# Patient Record
(demographics unavailable — no encounter records)

---

## 2024-12-26 NOTE — HISTORY OF PRESENT ILLNESS
[FreeTextEntry8] : back pain for 1 week - seen orth 12/24/2024 and was told muscle pain  left knee swollen for 2 yrs  pelvic pain - 1 week

## 2025-01-17 NOTE — HISTORY OF PRESENT ILLNESS
[Home] : at home, [unfilled] , at the time of the visit. [Medical Office: ([facility])___] : at the medical office located in  [Verbal consent obtained from patient] : the patient, [unfilled] [FreeTextEntry1] : Patient presents today for follow-up of chronic medical conditions.  [de-identified] : PMHx - Brain cyst, HLD   Patient with ongoing groin/pelvic pain  Discussed recent pelvic US showing ?fluid in the EM cavity of unclear significance Patient otherwise feels well - and pain/discomfort is mild  Several years ago - was having headaches and eventually had CT head showing a brain tumor  Patient underwent several neurosurgical consultations - after which the ultimate decision was made for conservative management  Plan to follow-up with interval imaging - and if no change will opt to avoid surgery   No social alcohol and now no headaches   Interim  4/2024 -  Has not started statin - hesitant to begin due to potential side effects Discussed with  who is cardiologist - recommends to start Has been having intermittent neck pain for last several weeks - denies associated HA Otherwise neurologically intact - no trauma, ROS otherwise negative

## 2025-01-17 NOTE — HISTORY OF PRESENT ILLNESS
[Home] : at home, [unfilled] , at the time of the visit. [Medical Office: ([facility])___] : at the medical office located in  [Verbal consent obtained from patient] : the patient, [unfilled] [FreeTextEntry1] : Patient presents today for follow-up of chronic medical conditions.  [de-identified] : PMHx - Brain cyst, HLD   Patient with ongoing groin/pelvic pain  Discussed recent pelvic US showing ?fluid in the EM cavity of unclear significance Patient otherwise feels well - and pain/discomfort is mild  Several years ago - was having headaches and eventually had CT head showing a brain tumor  Patient underwent several neurosurgical consultations - after which the ultimate decision was made for conservative management  Plan to follow-up with interval imaging - and if no change will opt to avoid surgery   No social alcohol and now no headaches   Interim  4/2024 -  Has not started statin - hesitant to begin due to potential side effects Discussed with  who is cardiologist - recommends to start Has been having intermittent neck pain for last several weeks - denies associated HA Otherwise neurologically intact - no trauma, ROS otherwise negative

## 2025-01-17 NOTE — HEALTH RISK ASSESSMENT
[Good] : ~his/her~  mood as  good [No] : In the past 12 months have you used drugs other than those required for medical reasons? No [No falls in past year] : Patient reported no falls in the past year [0] : 2) Feeling down, depressed, or hopeless: Not at all (0) [PHQ-2 Negative - No further assessment needed] : PHQ-2 Negative - No further assessment needed [Never] : Never [Patient reported PAP Smear was normal] : Patient reported PAP Smear was normal [None] : None [With Family] : lives with family [Employed] : employed [] :  [# Of Children ___] : has [unfilled] children [Fully functional (bathing, dressing, toileting, transferring, walking, feeding)] : Fully functional (bathing, dressing, toileting, transferring, walking, feeding) [Fully functional (using the telephone, shopping, preparing meals, housekeeping, doing laundry, using] : Fully functional and needs no help or supervision to perform IADLs (using the telephone, shopping, preparing meals, housekeeping, doing laundry, using transportation, managing medications and managing finances) [de-identified] : active [de-identified] : balanced varied diet without restrictions [[id number]] : 0 [Language] : denies difficulty with language [Handling Complex Tasks] : denies difficulty handling complex tasks [High Risk Behavior] : no high risk behavior [Reports changes in hearing] : Reports no changes in hearing [Reports changes in vision] : Reports no changes in vision [Reports changes in dental health] : Reports no changes in dental health [MammogramComments] : referral [PapSmearComments] : referral [PapSmearDate] : ? 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

## 2025-01-17 NOTE — HEALTH RISK ASSESSMENT
[Good] : ~his/her~  mood as  good [No] : In the past 12 months have you used drugs other than those required for medical reasons? No [No falls in past year] : Patient reported no falls in the past year [0] : 2) Feeling down, depressed, or hopeless: Not at all (0) [PHQ-2 Negative - No further assessment needed] : PHQ-2 Negative - No further assessment needed [Never] : Never [Patient reported PAP Smear was normal] : Patient reported PAP Smear was normal [None] : None [With Family] : lives with family [Employed] : employed [] :  [# Of Children ___] : has [unfilled] children [Fully functional (bathing, dressing, toileting, transferring, walking, feeding)] : Fully functional (bathing, dressing, toileting, transferring, walking, feeding) [Fully functional (using the telephone, shopping, preparing meals, housekeeping, doing laundry, using] : Fully functional and needs no help or supervision to perform IADLs (using the telephone, shopping, preparing meals, housekeeping, doing laundry, using transportation, managing medications and managing finances) [de-identified] : active [de-identified] : balanced varied diet without restrictions [[id number]] : 0 [Language] : denies difficulty with language [Handling Complex Tasks] : denies difficulty handling complex tasks [High Risk Behavior] : no high risk behavior [Reports changes in hearing] : Reports no changes in hearing [Reports changes in vision] : Reports no changes in vision [Reports changes in dental health] : Reports no changes in dental health [MammogramComments] : referral [PapSmearComments] : referral [PapSmearDate] : ? 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

## 2025-01-17 NOTE — HISTORY OF PRESENT ILLNESS
[Home] : at home, [unfilled] , at the time of the visit. [Medical Office: ([facility])___] : at the medical office located in  [Verbal consent obtained from patient] : the patient, [unfilled] [FreeTextEntry1] : Patient presents today for follow-up of chronic medical conditions.  [de-identified] : PMHx - Brain cyst, HLD   Patient with ongoing groin/pelvic pain  Discussed recent pelvic US showing ?fluid in the EM cavity of unclear significance Patient otherwise feels well - and pain/discomfort is mild  Several years ago - was having headaches and eventually had CT head showing a brain tumor  Patient underwent several neurosurgical consultations - after which the ultimate decision was made for conservative management  Plan to follow-up with interval imaging - and if no change will opt to avoid surgery   No social alcohol and now no headaches   Interim  4/2024 -  Has not started statin - hesitant to begin due to potential side effects Discussed with  who is cardiologist - recommends to start Has been having intermittent neck pain for last several weeks - denies associated HA Otherwise neurologically intact - no trauma, ROS otherwise negative

## 2025-01-17 NOTE — PLAN
[FreeTextEntry1] : [ ] pap smear  [ ] mammo + US  [ ] colonoscopy  [ ] PT referral  [ ] bone density   #HLD  lifestyle modification discussed at length repeat lipid profile 3 months ** encouraged to start statin  #Pelvic pain pelvic US showing moderate fluid distending hte EM cavity revealing nodularity versus debris along the EM lining [ ] CT abdomen/pelvis to assess surrounding structures [ ] EM biopsy per gynecology

## 2025-01-17 NOTE — HEALTH RISK ASSESSMENT
[Good] : ~his/her~  mood as  good [No] : In the past 12 months have you used drugs other than those required for medical reasons? No [No falls in past year] : Patient reported no falls in the past year [0] : 2) Feeling down, depressed, or hopeless: Not at all (0) [PHQ-2 Negative - No further assessment needed] : PHQ-2 Negative - No further assessment needed [Never] : Never [Patient reported PAP Smear was normal] : Patient reported PAP Smear was normal [None] : None [With Family] : lives with family [Employed] : employed [] :  [# Of Children ___] : has [unfilled] children [Fully functional (bathing, dressing, toileting, transferring, walking, feeding)] : Fully functional (bathing, dressing, toileting, transferring, walking, feeding) [Fully functional (using the telephone, shopping, preparing meals, housekeeping, doing laundry, using] : Fully functional and needs no help or supervision to perform IADLs (using the telephone, shopping, preparing meals, housekeeping, doing laundry, using transportation, managing medications and managing finances) [de-identified] : active [de-identified] : balanced varied diet without restrictions [[id number]] : 0 [Language] : denies difficulty with language [High Risk Behavior] : no high risk behavior [Handling Complex Tasks] : denies difficulty handling complex tasks [Reports changes in hearing] : Reports no changes in hearing [Reports changes in vision] : Reports no changes in vision [Reports changes in dental health] : Reports no changes in dental health [MammogramComments] : referral [PapSmearComments] : referral [PapSmearDate] : ? 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

## 2025-02-05 NOTE — HISTORY OF PRESENT ILLNESS
[FreeTextEntry1] : WWV P0 Pt is a OT Rev TVS--rec pelvic MRI to assess EL--pt declines [Mammogramdate] : 2020 [PapSmeardate] : 2020 [ColonoscopyDate] : declines

## 2025-02-05 NOTE — PHYSICAL EXAM
[Chaperone Present] : A chaperone was present in the examining room during all aspects of the physical examination [FreeTextEntry2] : Negin Bird [Appropriately responsive] : appropriately responsive [Alert] : alert [No Acute Distress] : no acute distress [No Lymphadenopathy] : no lymphadenopathy [Soft] : soft [Non-tender] : non-tender [Non-distended] : non-distended [No HSM] : No HSM [No Lesions] : no lesions [No Mass] : no mass [Oriented x3] : oriented x3 [Examination Of The Breasts] : a normal appearance [No Masses] : no breast masses were palpable [Labia Majora] : normal [Labia Minora] : normal [Atrophy] : atrophy [Normal] : normal [Uterine Adnexae] : normal

## 2025-02-14 NOTE — PHYSICAL EXAM
[General Appearance - Alert] : alert [General Appearance - In No Acute Distress] : in no acute distress [Oriented To Time, Place, And Person] : oriented to person, place, and time [Affect] : the affect was normal [Span Intact] : the attention span was normal [Concentration Intact] : normal concentrating ability [Fluency] : fluency intact [Comprehension] : comprehension intact [Cranial Nerves Oculomotor (III)] : extraocular motion intact [Cranial Nerves Facial (VII)] : face symmetrical [Cranial Nerves Vestibulocochlear (VIII)] : hearing was intact bilaterally [Cranial Nerves Accessory (XI - Cranial And Spinal)] : head turning and shoulder shrug symmetric [Cranial Nerves Hypoglossal (XII)] : there was no tongue deviation with protrusion [Motor Tone] : muscle tone was normal in all four extremities [Motor Strength] : muscle strength was normal in all four extremities [Involuntary Movements] : no involuntary movements were seen [Abnormal Walk] : normal gait [Balance] : balance was intact [Limited Balance] : balance was intact

## 2025-02-14 NOTE — ASSESSMENT
[FreeTextEntry1] : My impression is that the patient suffers from a left frontal mass that is currently stable. The patient's established problem of headache has resolved. 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 Her KPS is 100. I had a long discussion with the patient regarding the role of follow up with serial MRI. The patient was extensively educated about the nature of her disease process. Therapeutic and diagnostic tests include MRI brain and without contrast in 2 year. I will see the patient back in Jan/Feb 2027 with a new MRI.

## 2025-02-14 NOTE — HISTORY OF PRESENT ILLNESS
[de-identified] : 67 year old female with longstanding headaches more recently more pronounced on the left side with tinnitus. Her headaches are relieved with advil. Her headaches have improved with cessation of alcohol.  She underwent MRI brain which revealed a left frontal cystic lesion for which she presents today to discuss treatment recommendations. It has been recommended to her that this lesion be resected.   She states her headaches have essentially resolved. She had neuropsych testing with Dr. Barnes last year and was recommended to continue CBT and start and antidepressant.    TODAY 02/14/2025 presents for a routine MRI review.

## 2025-02-14 NOTE — HISTORY OF PRESENT ILLNESS
[de-identified] : 67 year old female with longstanding headaches more recently more pronounced on the left side with tinnitus. Her headaches are relieved with advil. Her headaches have improved with cessation of alcohol.  She underwent MRI brain which revealed a left frontal cystic lesion for which she presents today to discuss treatment recommendations. It has been recommended to her that this lesion be resected.   She states her headaches have essentially resolved. She had neuropsych testing with Dr. Barnes last year and was recommended to continue CBT and start and antidepressant.    TODAY 02/14/2025 presents for a routine MRI review.